# Patient Record
Sex: FEMALE | Race: WHITE
[De-identification: names, ages, dates, MRNs, and addresses within clinical notes are randomized per-mention and may not be internally consistent; named-entity substitution may affect disease eponyms.]

---

## 2019-12-31 ENCOUNTER — HOSPITAL ENCOUNTER (OUTPATIENT)
Dept: HOSPITAL 61 - KCIC MRI | Age: 79
Discharge: HOME | End: 2019-12-31
Attending: PSYCHIATRY & NEUROLOGY
Payer: MEDICARE

## 2019-12-31 DIAGNOSIS — J34.89: ICD-10-CM

## 2019-12-31 DIAGNOSIS — I67.82: Primary | ICD-10-CM

## 2019-12-31 DIAGNOSIS — M47.812: ICD-10-CM

## 2019-12-31 PROCEDURE — 70551 MRI BRAIN STEM W/O DYE: CPT

## 2019-12-31 NOTE — [facility]
BRAIN W/O CONTRAST 

 

Date: 12/31/2019 11:45 AM 

 

Indication:  Memory loss, history of head trauma 

 

Comparison:  None. 

 

Technique: Multiplanar multisequence MRI of the brain was performed 

without intravenous contrast using the standard protocol.

 

Findings: 

 

No acute infarct. No acute or chronic hemorrhage. The ventricles are 

normal in size and configuration without hydrocephalus. Cystic foci in the

right anterior temporal lobe adjacent to the MCA bifurcation measuring 1 

cm. 

 

Mild scattered FLAIR hyperintensities in the subcortical and 

periventricular deep white matter, a nonspecific finding, most commonly 

seen with chronic small vessel ischemic disease. Mild generalized cerebral

volume loss

 

The scalp and calvarium are normal. The pituitary and sella are normal. No

Chiari malformation. Mild incompletely characterized degenerative 

spondylosis of the visualized upper cervical spine.

 

The visualized orbits and globes are normal. Mild frontal and ethmoid 

sinus mucosal thickening. The mastoid air cells are clear.

 

Normal flow voids within the vertebral, basilar, and internal carotid 

arteries indicating patency.

 

IMPRESSION:

 

1. No acute infarct, hemorrhage, or hydrocephalus.

 

2. Cystic focus in the right anterior temporal lobe measuring 1.0 cm. This

probably represents a benign entity such as an anterior temporal lobe 

perivascular space or neuroepithelial cyst, however postcontrast imaging 

is recommended to ensure no suspicious enhancing components.

 

3. Mild chronic small vessel ischemic disease and age-appropriate 

generalized cerebral volume loss.

 

Electronically signed by: Shade Garrett MD (12/31/2019 2:30 PM) 

[id number]

## 2020-01-16 ENCOUNTER — HOSPITAL ENCOUNTER (OUTPATIENT)
Dept: HOSPITAL 61 - KCIC MRI | Age: 80
Discharge: HOME | End: 2020-01-16
Attending: PSYCHIATRY & NEUROLOGY
Payer: MEDICARE
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
## 2020-01-16 DIAGNOSIS — R41.3: ICD-10-CM

## 2020-01-16 DIAGNOSIS — G93.89: ICD-10-CM
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
## 2020-01-16 DIAGNOSIS — S09.90XA: Primary | ICD-10-CM

## 2020-01-16 PROCEDURE — 82565 ASSAY OF CREATININE: CPT

## 2020-01-16 PROCEDURE — 70552 MRI BRAIN STEM W/DYE: CPT

## 2020-01-16 NOTE — [facility]
BRAIN W/CONTRAST

 

History: Memory loss. History of trauma. Cystic lesion on prior 

noncontrast brain MRI.

 

Technique: Multiplanar, multi sequential MR imaging was performed of the 

brain without contrast.

 

Comparison: December 31, 2019.

 

Findings:

Right anterior temporal cystic lesion measures 1.2 x 0.7 cm. No pathologic

enhancement.

 

Left pituitary cystic lesion, unchanged.

 

No hydrocephalus. Patent dural venous sinuses.

 

Imaged paranasal sinuses and mastoid air cells are clear.

 

Impression:

1.  Right anterior temporal cystic lesion, may represent prominent 

perivascular space or neuroepithelial cyst. No pathologic enhancement. 

 

Electronically signed by: Ry Gonzales DO (1/16/2020 3:03 PM) [id number]